# Patient Record
Sex: MALE | Race: WHITE | NOT HISPANIC OR LATINO | Employment: FULL TIME | URBAN - METROPOLITAN AREA
[De-identification: names, ages, dates, MRNs, and addresses within clinical notes are randomized per-mention and may not be internally consistent; named-entity substitution may affect disease eponyms.]

---

## 2019-11-07 ENCOUNTER — OFFICE VISIT (OUTPATIENT)
Dept: SLEEP MEDICINE | Facility: HOSPITAL | Age: 69
End: 2019-11-07

## 2019-11-07 VITALS
WEIGHT: 277 LBS | BODY MASS INDEX: 41.03 KG/M2 | HEART RATE: 84 BPM | HEIGHT: 69 IN | SYSTOLIC BLOOD PRESSURE: 136 MMHG | OXYGEN SATURATION: 94 % | DIASTOLIC BLOOD PRESSURE: 71 MMHG

## 2019-11-07 DIAGNOSIS — G47.14 HYPERSOMNIA DUE TO MEDICAL CONDITION: ICD-10-CM

## 2019-11-07 DIAGNOSIS — IMO0002 SLEEP-RELATED HYPOVENTILATION: ICD-10-CM

## 2019-11-07 DIAGNOSIS — G47.33 OSA (OBSTRUCTIVE SLEEP APNEA): Primary | ICD-10-CM

## 2019-11-07 DIAGNOSIS — E66.01 CLASS 3 SEVERE OBESITY DUE TO EXCESS CALORIES WITH SERIOUS COMORBIDITY AND BODY MASS INDEX (BMI) OF 40.0 TO 44.9 IN ADULT (HCC): ICD-10-CM

## 2019-11-07 PROCEDURE — 99204 OFFICE O/P NEW MOD 45 MIN: CPT | Performed by: INTERNAL MEDICINE

## 2019-11-07 PROCEDURE — G0463 HOSPITAL OUTPT CLINIC VISIT: HCPCS

## 2019-11-07 NOTE — PROGRESS NOTES
[facility] New Patient/Consultation       Reason for Consultation: TOON     Patient Care Team:  Provider, No Known as PCP - Roddy Loja MD as Consulting Physician (Cardiac Electrophysiology)  Roderick Gunderson MD as Consulting Physician (Sleep Medicine)    Chief complaint: TONO    History of present illness:    Thank you for asking me to see your patient.  The patient is a 69 y.o. male who underwent overnight polysomnogram 12/11/2017.  Moderate severity TONO identified with AHI 18 events per hour.  Sleep-related hypoxia also present.  The [place] that raised [place] suspicion?  He never followed up with set up.    Due to waking up several times nightly and having episodes of no sleeping, he requested evaluation.  He goes to bed between 10 PM and 1 AM and awakens between 6 and 8 AM.  He is tired upon arising.  He will take 1 nap daily.  Besides working in , he is an auctioneer.  Additionally, the patient does work 12:00 to 8:00 shift.    The patient has complaints of hypersomnolence and his [place] Sleepiness Scale is abnormal at 19.  Patient has gained 30 pounds since his last sleep study.  He snores loudly and witnessed apnea noted.  He has a dry mouth in the morning.  He does have some discomfort in his legs that improved with movement.  He grinds his teeth.  He has difficulty staying asleep and he will use the restroom 2 or 3 times during the nighttime.    Review of Systems:    A complete review of systems was done and all were negative with the exception of frequent urination, nasal congestion, history of irregular heartbeat, swollen ankles, cough, and VARGAS,    History:  Past Medical History:   Diagnosis Date   • Arrhythmia     History of a flutter with previous ablation.  Patient has a pacemaker.  Paroxysmal atrial fibrillation.   • BPH (benign prostatic hyperplasia)    • TONO (obstructive sleep apnea) 12/11/2017    Overnight  "polysomnogram.  Weight 245 pounds.  AHI moderately abnormal at 18 events per hour.  Lowest O2 saturation 79% and sleep-related hypoxia present for 9.2 minutes.   , History reviewed. No pertinent surgical history.,   Family History   Problem Relation Age of Onset   • Stroke Mother    • Hypertension Mother    • Other Father         Prostate    and   Social History     Tobacco Use   • Smoking status: Current Every Day Smoker     Packs/day: 1.00     Start date: 1972   • Smokeless tobacco: Never Used   Substance Use Topics   • Alcohol use: Yes     Comment: 1/week   • Drug use: No     Social History: 2 caffeinated beverages a day    Allergies:  Penicillins and Sulfa antibiotics     Medication Review: His medicine list was reviewed    Vital Signs:    Vitals:    11/07/19 0944   BP: 136/71   Pulse: 84   SpO2: 94%   Weight: 126 kg (277 lb)   Height: 175.3 cm (69\")      Body mass index is 40.91 kg/m².  Neck Circumference: 19 inches      Physical Exam:    Constitutional:  Well developed 69 y.o. male that appears in no apparent distress.  Awake & oriented times 3.  Normal mood with normal recent and remote memory and normal judgement.  Eyes:  Conjunctivae normal.  Oropharynx: Moist mucous membranes without exudate and a large tongue and class III MP airway  Neck: Trachea midline  Respiratory: Effort is not labored  Cardiovascular: Radial pulse regular  Musculoskeletal: Gait appears normal, no digital clubbing evident, at least 1+ pre-tibial edema    Results Review: I reviewed his previous overnight polysomnogram       Impression:   Moderate severity obstructive sleep apnea with sleep-related hypoxia by overnight polysomnogram December 2017.  The patient has complaints of hypersomnolence.    Plan:  Good sleep hygiene measures should be maintained.  Weight loss would be beneficial in this patient who is morbidly obese.    Pathophysiology of TONO described to the patient.  Cardiovascular complications of untreated TONO also reviewed. "      After reviewing all, since the patient has not been set up on auto CPAP in the past, I would recommend auto titrating CPAP between 7 and 16 cm water pressure.  An appropriate interface should be selected.  After several weeks, overnight oximetry on room air on auto CPAP should be obtained.  I will see the patient back in 2 months for downloads to determine compliance.  The patient will call if he has any problems.    Thank you for requesting me to assist in this patient's care.    Roderick Gunderson MD  Sleep Medicine  11/10/19  8:17 AM

## 2019-11-10 PROBLEM — E66.01 CLASS 3 SEVERE OBESITY DUE TO EXCESS CALORIES WITH SERIOUS COMORBIDITY AND BODY MASS INDEX (BMI) OF 40.0 TO 44.9 IN ADULT (HCC): Status: ACTIVE | Noted: 2019-11-10

## 2019-11-10 PROBLEM — IMO0002 SLEEP-RELATED HYPOVENTILATION: Status: ACTIVE | Noted: 2019-11-10

## 2019-11-10 PROBLEM — G47.14 HYPERSOMNIA DUE TO MEDICAL CONDITION: Status: ACTIVE | Noted: 2019-11-10

## 2019-11-10 PROBLEM — G47.33 OSA (OBSTRUCTIVE SLEEP APNEA): Status: ACTIVE | Noted: 2017-12-11

## 2019-11-12 ENCOUNTER — TELEPHONE (OUTPATIENT)
Dept: SLEEP MEDICINE | Facility: HOSPITAL | Age: 69
End: 2019-11-12

## 2019-11-12 NOTE — TELEPHONE ENCOUNTER
Lm for pt to cb to let pt know we are faxing orders to [facility] for a cpap and pt needs to schedule a compliance f/u.

## 2019-11-13 ENCOUNTER — TELEPHONE (OUTPATIENT)
Dept: SLEEP MEDICINE | Facility: HOSPITAL | Age: 69
End: 2019-11-13

## 2019-11-22 ENCOUNTER — TELEPHONE (OUTPATIENT)
Dept: SLEEP MEDICINE | Facility: HOSPITAL | Age: 69
End: 2019-11-22

## 2019-11-22 NOTE — TELEPHONE ENCOUNTER
Joleen called patient stating that he has to retake sleep study . Reached out to different DME to veryfy that was needed for set up.. They submitted the CPAP set up request to insurance and were approved . Patient being set up with CPAP through [facility]

## 2020-01-04 ENCOUNTER — TELEPHONE (OUTPATIENT)
Dept: SLEEP MEDICINE | Facility: HOSPITAL | Age: 70
End: 2020-01-04

## 2020-01-04 NOTE — TELEPHONE ENCOUNTER
Overnight oximetry performed on auto CPAP and room air 12/27/2019.    Total valid sampling time 8 hours and 19 minutes.  Lowest O2 saturation 77%.  Total time less than equal to 88% was 368 minutes?  This will be addressed at follow-up.

## 2020-01-15 ENCOUNTER — OFFICE VISIT (OUTPATIENT)
Dept: SLEEP MEDICINE | Facility: HOSPITAL | Age: 70
End: 2020-01-15

## 2020-01-15 VITALS — HEIGHT: 68 IN | BODY MASS INDEX: 42.28 KG/M2 | WEIGHT: 279 LBS

## 2020-01-15 DIAGNOSIS — IMO0002 SLEEP-RELATED HYPOVENTILATION: ICD-10-CM

## 2020-01-15 DIAGNOSIS — G47.14 HYPERSOMNIA DUE TO MEDICAL CONDITION: ICD-10-CM

## 2020-01-15 DIAGNOSIS — G47.33 OSA (OBSTRUCTIVE SLEEP APNEA): Primary | ICD-10-CM

## 2020-01-15 DIAGNOSIS — E66.01 CLASS 3 SEVERE OBESITY DUE TO EXCESS CALORIES WITH SERIOUS COMORBIDITY AND BODY MASS INDEX (BMI) OF 40.0 TO 44.9 IN ADULT (HCC): ICD-10-CM

## 2020-01-15 PROCEDURE — 99214 OFFICE O/P EST MOD 30 MIN: CPT | Performed by: INTERNAL MEDICINE

## 2020-01-15 PROCEDURE — G0463 HOSPITAL OUTPT CLINIC VISIT: HCPCS

## 2020-01-15 NOTE — PROGRESS NOTES
"[facility] Up [facility] Note     Chief Complaint:  TONO     Primary Care Physician: Provider, No Known    Interval History:   I initially saw the patient in November 2019.  The patient had an overnight polysomnogram December 2017.  Moderate obstructive sleep apnea with sleep-related hypoxia identified.  Set up never performed.  After reviewing all, I recommended auto CPAP and he was acceptable of this.  He is here today for follow-up.  He states he is improved.  He goes to bed 11 PM and awakens at 6 AM.  He wakes up 3-4 times during the nighttime.  He will use the bathroom.  [place] Sleepiness Scale is still abnormal at 14.    Review of Systems:    A complete review of systems was done and all were negative with the exception of nasal congestion and postnasal drip and shortness of breath    The patient reports no changes in his past medical history or family history since I last saw him    Social History:    Social History     Socioeconomic History   • Marital status:      Spouse name: Not on file   • Number of children: Not on file   • Years of education: Not on file   • Highest education level: Not on file   Tobacco Use   • Smoking status: Current Every Day Smoker     Packs/day: 1.00     Start date: 1972   • Smokeless tobacco: Never Used   Substance and Sexual Activity   • Alcohol use: Yes     Comment: 1/week   • Drug use: No   • Sexual activity: Defer       Allergies:  Penicillins and Sulfa antibiotics     Medication Review:  Reviewed.      Vital Signs:    Vitals:    01/15/20 0900   Weight: 127 kg (279 lb)   Height: 172.7 cm (68\")     Body mass index is 42.42 kg/m².    Physical Exam:    Constitutional:  Well developed 69 y.o. male that appears in no apparent distress.  Awake & oriented times 3.  Normal mood with normal recent and remote memory and normal judgement.  Eyes:  Conjunctivae normal.  Oropharynx:  moist mucous membranes without exudate and a large tongue and class III Mallampati " airway  Neck: Trachea midline  Respiratory: Effort slightly labored upon entering my room.  Musculoskeletal: Gait appears normal with some lower extremity edema present     Results Review:  DME is [facility] and he uses a nasal interface.  Downloads between 12/2 and 1/12/2020 compliance greater than 95%.  Average usage is 5 hours and 15 minutes.  Average AHI is normal at 5.5 without leak.  Average AutoCPAP pressure is 10 and his auto CPAP is 7-16.    Overnight oximetry 12/27/2019 with duration 8 hours and 19 minutes demonstrated a low O2 saturation is 77% and time less than equal to 88% was 367 minutes?       Impression:   Moderate obstructive sleep apnea with sleep-related hypoxia by overnight polysomnogram December 2017 adequately treated with auto CPAP with good compliance and usage and persistent complaints of hypersomnolence.  Overnight oximetry 12/27/2019 with auto CPAP demonstrates persistent sleep-related hypoxia.    Plan:  Good sleep hygiene measures should be maintained.  Weight loss would be beneficial in this patient who is morbidly obese by BMI.  The patient is benefiting from the treatment being provided.     I reviewed all with the patient.  I will recheck overnight oximetry on auto CPAP during the first week of February.    Due to his continued cigarette usage and his complaints of shortness of breath I would recommend pulmonary evaluation.  This will be arranged.  I also counseled the patient concerning his continued smoking    The patient will call for any problems and will follow up in 4 months.      Roderick Gunderson MD  Sleep Medicine  01/15/20  9:54 AM

## 2020-01-20 ENCOUNTER — TELEPHONE (OUTPATIENT)
Dept: SLEEP MEDICINE | Facility: HOSPITAL | Age: 70
End: 2020-01-20

## 2020-03-03 ENCOUNTER — TRANSCRIBE ORDERS (OUTPATIENT)
Dept: ADMINISTRATIVE | Facility: HOSPITAL | Age: 70
End: 2020-03-03

## 2020-03-03 DIAGNOSIS — Z12.2 ENCOUNTER FOR SCREENING FOR LUNG CANCER: Primary | ICD-10-CM

## 2020-08-03 ENCOUNTER — APPOINTMENT (OUTPATIENT)
Dept: CT IMAGING | Facility: HOSPITAL | Age: 70
End: 2020-08-03

## 2020-09-02 ENCOUNTER — OFFICE VISIT (OUTPATIENT)
Dept: SLEEP MEDICINE | Facility: HOSPITAL | Age: 70
End: 2020-09-02

## 2020-09-02 VITALS — HEART RATE: 65 BPM | WEIGHT: 267.6 LBS | HEIGHT: 68 IN | OXYGEN SATURATION: 94 % | BODY MASS INDEX: 40.56 KG/M2

## 2020-09-02 DIAGNOSIS — G47.14 HYPERSOMNIA DUE TO MEDICAL CONDITION: ICD-10-CM

## 2020-09-02 DIAGNOSIS — E66.01 CLASS 3 SEVERE OBESITY DUE TO EXCESS CALORIES WITH SERIOUS COMORBIDITY AND BODY MASS INDEX (BMI) OF 40.0 TO 44.9 IN ADULT (HCC): ICD-10-CM

## 2020-09-02 DIAGNOSIS — G47.33 OSA (OBSTRUCTIVE SLEEP APNEA): Primary | ICD-10-CM

## 2020-09-02 DIAGNOSIS — IMO0002 SLEEP-RELATED HYPOVENTILATION: ICD-10-CM

## 2020-09-02 PROCEDURE — 99213 OFFICE O/P EST LOW 20 MIN: CPT | Performed by: INTERNAL MEDICINE

## 2020-09-02 PROCEDURE — G0463 HOSPITAL OUTPT CLINIC VISIT: HCPCS

## 2020-09-02 NOTE — PROGRESS NOTES
"Follow Up [facility] Note     Chief Complaint:  TONO     Primary Care Physician: Provider, No Known    Interval History:   The patient is a 70 y.o. male who I last saw in January of this year.  He states he is unchanged.  However, he has not uses CPAP much because of nasal congestion.  The patient goes to bed at midnight and awakens at 6 AM.  [place] Sleepiness Scale is abnormal at 10.    Review of Systems:    A complete review of systems was done and all were negative with the exception of nasal congestion, shortness of breath, and cough.    Social History:    Social History     Socioeconomic History   • Marital status:      Spouse name: Not on file   • Number of children: Not on file   • Years of education: Not on file   • Highest education level: Not on file   Tobacco Use   • Smoking status: Current Every Day Smoker     Packs/day: 1.00     Start date: 1972   • Smokeless tobacco: Never Used   Substance and Sexual Activity   • Alcohol use: Yes     Comment: 1/week   • Drug use: No   • Sexual activity: Defer       Allergies:  Penicillins and Sulfa antibiotics     Medication Review:  Reviewed.      Vital Signs:    Vitals:    09/02/20 0905   Pulse: 65   SpO2: 94%   Weight: 121 kg (267 lb 9.6 oz)   Height: 172.7 cm (68\")     Body mass index is 40.69 kg/m².    Physical Exam:    Constitutional:  Well developed 70 y.o. male that appears in no apparent distress.  Awake & oriented times 3.  Normal mood with normal recent and remote memory and normal judgement.  Eyes:  Conjunctivae normal.  Oropharynx: Previously, moist mucous membranes without exudate and a large tongue and class III Mallampati airway, patient is wearing a facemask.     Results Review:  DME is [facility] and he uses nasal cushion.  Downloads between 6/15 and 8/19/2020 compliance less than 50%.  Average usage is 5 hours and 2 minutes.  Average AHI is normal without leak.  Average AutoCPAP pressure is 9.6 and his auto CPAP is " 7-16.       Impression:   Moderate obstructive sleep apnea with sleep-related hypoxia by overnight polysomnogram December 2017 adequately treated with auto CPAP with suboptimal compliance and acceptable usage when used and persistent complaints of hypersomnolence.    Plan:  Good sleep hygiene measures should be maintained.  Weight loss would be beneficial in this patient who is morbidly obese by BMI.  The patient is benefiting from the treatment being provided.     The patient needs to use his auto CPAP every night.  I recommended Benadryl 25 mg at bedtime for nasal congestion that occurs during the nighttime.    The patient will call for any problems and will follow up in 4 months.      Roderick Gunderson MD  Sleep Medicine  09/02/20  09:29

## 2021-01-18 ENCOUNTER — TRANSCRIBE ORDERS (OUTPATIENT)
Dept: ADMINISTRATIVE | Facility: HOSPITAL | Age: 71
End: 2021-01-18

## 2021-01-18 DIAGNOSIS — F17.218 NICOTINE DEPENDENCE, CIGARETTES, WITH OTHER NICOTINE-INDUCED DISORDERS: ICD-10-CM

## 2021-01-18 DIAGNOSIS — Z12.2 ENCOUNTER FOR SCREENING FOR LUNG CANCER: Primary | ICD-10-CM

## 2021-02-03 ENCOUNTER — HOSPITAL ENCOUNTER (OUTPATIENT)
Dept: CT IMAGING | Facility: HOSPITAL | Age: 71
Discharge: HOME | End: 2021-02-03
Admitting: INTERNAL MEDICINE

## 2021-02-03 DIAGNOSIS — F17.218 NICOTINE DEPENDENCE, CIGARETTES, WITH OTHER NICOTINE-INDUCED DISORDERS: ICD-10-CM

## 2021-02-03 DIAGNOSIS — Z12.2 ENCOUNTER FOR SCREENING FOR LUNG CANCER: ICD-10-CM

## 2021-02-03 PROCEDURE — 71271 CT THORAX LUNG CANCER SCR C-: CPT

## 2021-10-21 ENCOUNTER — TRANSCRIBE ORDERS (OUTPATIENT)
Dept: ADMINISTRATIVE | Facility: HOSPITAL | Age: 71
End: 2021-10-21

## 2021-10-21 DIAGNOSIS — R06.02 SOB (SHORTNESS OF BREATH): Primary | ICD-10-CM

## 2021-11-08 ENCOUNTER — HOSPITAL ENCOUNTER (OUTPATIENT)
Dept: CT IMAGING | Facility: HOSPITAL | Age: 71
Discharge: HOME | End: 2021-11-08
Admitting: INTERNAL MEDICINE

## 2021-11-08 DIAGNOSIS — R06.02 SOB (SHORTNESS OF BREATH): ICD-10-CM

## 2021-11-08 PROCEDURE — 71250 CT THORAX DX C-: CPT

## 2021-11-15 ENCOUNTER — TRANSCRIBE ORDERS (OUTPATIENT)
Dept: ADMINISTRATIVE | Facility: HOSPITAL | Age: 71
End: 2021-11-15

## 2021-11-15 DIAGNOSIS — R91.8 LUNG NODULES: Primary | ICD-10-CM

## 2022-11-14 ENCOUNTER — APPOINTMENT (OUTPATIENT)
Dept: CT IMAGING | Facility: HOSPITAL | Age: 72
End: 2022-11-14